# Patient Record
Sex: FEMALE | Race: WHITE | NOT HISPANIC OR LATINO | Employment: OTHER | URBAN - METROPOLITAN AREA
[De-identification: names, ages, dates, MRNs, and addresses within clinical notes are randomized per-mention and may not be internally consistent; named-entity substitution may affect disease eponyms.]

---

## 2023-03-22 ENCOUNTER — OFFICE VISIT (OUTPATIENT)
Dept: URGENT CARE | Facility: CLINIC | Age: 68
End: 2023-03-22
Payer: MEDICARE

## 2023-03-22 VITALS
HEART RATE: 104 BPM | RESPIRATION RATE: 20 BRPM | SYSTOLIC BLOOD PRESSURE: 148 MMHG | HEIGHT: 64 IN | DIASTOLIC BLOOD PRESSURE: 85 MMHG | TEMPERATURE: 99 F | BODY MASS INDEX: 25.61 KG/M2 | OXYGEN SATURATION: 92 % | WEIGHT: 150 LBS

## 2023-03-22 DIAGNOSIS — R52 BODY ACHES: ICD-10-CM

## 2023-03-22 DIAGNOSIS — R68.83 CHILLS: ICD-10-CM

## 2023-03-22 DIAGNOSIS — J02.0 STREP PHARYNGITIS: ICD-10-CM

## 2023-03-22 DIAGNOSIS — J02.9 SORE THROAT: Primary | ICD-10-CM

## 2023-03-22 LAB
CTP QC/QA: YES
CTP QC/QA: YES
MOLECULAR STREP A: POSITIVE
SARS-COV-2 AG RESP QL IA.RAPID: NEGATIVE

## 2023-03-22 PROCEDURE — 87651 POCT STREP A MOLECULAR: ICD-10-PCS | Mod: QW,S$GLB,, | Performed by: FAMILY MEDICINE

## 2023-03-22 PROCEDURE — 99214 OFFICE O/P EST MOD 30 MIN: CPT | Mod: S$GLB,,, | Performed by: FAMILY MEDICINE

## 2023-03-22 PROCEDURE — 87811 SARS-COV-2 COVID19 W/OPTIC: CPT | Mod: QW,S$GLB,, | Performed by: FAMILY MEDICINE

## 2023-03-22 PROCEDURE — 87811 SARS CORONAVIRUS 2 ANTIGEN POCT, MANUAL READ: ICD-10-PCS | Mod: QW,S$GLB,, | Performed by: FAMILY MEDICINE

## 2023-03-22 PROCEDURE — 99214 PR OFFICE/OUTPT VISIT, EST, LEVL IV, 30-39 MIN: ICD-10-PCS | Mod: S$GLB,,, | Performed by: FAMILY MEDICINE

## 2023-03-22 PROCEDURE — 87651 STREP A DNA AMP PROBE: CPT | Mod: QW,S$GLB,, | Performed by: FAMILY MEDICINE

## 2023-03-22 RX ORDER — AMLODIPINE BESYLATE 5 MG/1
5 TABLET ORAL EVERY MORNING
COMMUNITY
Start: 2023-02-25

## 2023-03-22 RX ORDER — DIAZEPAM 2 MG/1
2 TABLET ORAL
COMMUNITY
Start: 2023-02-24

## 2023-03-22 RX ORDER — GLIPIZIDE 5 MG/1
10 TABLET ORAL 2 TIMES DAILY
COMMUNITY
Start: 2023-03-20

## 2023-03-22 RX ORDER — PANTOPRAZOLE SODIUM 40 MG/1
40 TABLET, DELAYED RELEASE ORAL
COMMUNITY
Start: 2023-03-20

## 2023-03-22 RX ORDER — LISINOPRIL 10 MG/1
10 TABLET ORAL
COMMUNITY
Start: 2023-03-20

## 2023-03-22 RX ORDER — BUPROPION HYDROCHLORIDE 150 MG/1
150 TABLET, EXTENDED RELEASE ORAL 2 TIMES DAILY
COMMUNITY
Start: 2023-02-17

## 2023-03-22 RX ORDER — METFORMIN HYDROCHLORIDE 500 MG/1
500 TABLET ORAL 2 TIMES DAILY
COMMUNITY
Start: 2023-02-15

## 2023-03-22 RX ORDER — PENICILLIN V POTASSIUM 500 MG/1
500 TABLET, FILM COATED ORAL 2 TIMES DAILY
Qty: 20 TABLET | Refills: 0 | Status: SHIPPED | OUTPATIENT
Start: 2023-03-22 | End: 2023-04-01

## 2023-03-22 RX ORDER — DAPAGLIFLOZIN 10 MG/1
10 TABLET, FILM COATED ORAL
COMMUNITY
Start: 2023-02-15

## 2023-03-22 RX ORDER — METHADONE HYDROCHLORIDE 10 MG/1
TABLET ORAL
COMMUNITY
Start: 2023-03-17

## 2023-03-22 RX ORDER — DICLOFENAC SODIUM 10 MG/G
GEL TOPICAL
COMMUNITY
Start: 2022-12-16

## 2023-03-22 RX ORDER — PENICILLIN V POTASSIUM 500 MG/1
500 TABLET, FILM COATED ORAL 2 TIMES DAILY
Qty: 20 TABLET | Refills: 0 | Status: SHIPPED | OUTPATIENT
Start: 2023-03-22 | End: 2023-03-22

## 2023-03-22 NOTE — PROGRESS NOTES
"Subjective:       Patient ID: Lilian Alvarez is a 67 y.o. female.    Vitals:  height is 5' 4" (1.626 m) and weight is 68 kg (150 lb). Her oral temperature is 99 °F (37.2 °C). Her blood pressure is 148/85 (abnormal) and her pulse is 104. Her respiration is 20 and oxygen saturation is 92% (abnormal).     Chief Complaint: Sinus Problem    Pt complain of sore throat, body aches, and chills that started yesterday.  Oxygen saturation is found to be low at 92.  Patient states that low oxygen is normal for her as she suffers from lung cancer.  Denies chest pain, SOB, cough, runny nose, abdominal pain, nausea/vomiting, dysuria.  Pt took cortisone which gave a mild relief.     Sinus Problem  This is a new problem. The current episode started yesterday. The problem has been gradually worsening since onset. There has been no fever. Her pain is at a severity of 10/10. The pain is severe. Associated symptoms include chills, headaches and a sore throat. Pertinent negatives include no congestion, coughing, diaphoresis, ear pain, hoarse voice, neck pain, shortness of breath, sinus pressure, sneezing or swollen glands. Treatments tried: cortisone. The treatment provided mild relief.     Constitution: Positive for chills. Negative for sweating.   HENT:  Positive for sore throat. Negative for ear pain, congestion and sinus pressure.    Neck: Negative for neck pain.   Respiratory:  Negative for cough and shortness of breath.    Allergic/Immunologic: Negative for sneezing.   Neurological:  Positive for headaches.     Objective:      Physical Exam   Constitutional: She is oriented to person, place, and time. She appears well-developed. She is cooperative.  Non-toxic appearance. She does not appear ill. No distress.   HENT:   Head: Normocephalic and atraumatic.   Ears:   Right Ear: Hearing, tympanic membrane, external ear and ear canal normal. impacted cerumen  Left Ear: Hearing, tympanic membrane, external ear and ear canal normal. impacted " cerumen  Nose: Nose normal. No mucosal edema, rhinorrhea, nasal deformity or congestion. No epistaxis. Right sinus exhibits no maxillary sinus tenderness and no frontal sinus tenderness. Left sinus exhibits no maxillary sinus tenderness and no frontal sinus tenderness.   Mouth/Throat: Uvula is midline, oropharynx is clear and moist and mucous membranes are normal. No trismus in the jaw. Normal dentition. No uvula swelling. No oropharyngeal exudate or posterior oropharyngeal edema.      Comments: +ve pharyngeal erythema w/o tonsillar swelling or exudates.       Eyes: Conjunctivae and lids are normal. No scleral icterus.   Neck: Trachea normal and phonation normal. Neck supple. No edema present. No erythema present. No neck rigidity present.   Cardiovascular: Normal rate, regular rhythm, normal heart sounds and normal pulses.   No murmur heard.  Pulmonary/Chest: Effort normal and breath sounds normal. No stridor. No respiratory distress. She has no decreased breath sounds. She has no wheezes. She has no rhonchi. She has no rales.   Abdominal: Normal appearance. She exhibits no distension. There is no abdominal tenderness.   Musculoskeletal: Normal range of motion.         General: No deformity. Normal range of motion.      Cervical back: She exhibits no tenderness.   Lymphadenopathy:     She has no cervical adenopathy.   Neurological: She is alert and oriented to person, place, and time. She exhibits normal muscle tone. Coordination normal.   Skin: Skin is warm, dry, intact, not diaphoretic and not pale.   Psychiatric: Her speech is normal and behavior is normal. Judgment and thought content normal.   Nursing note and vitals reviewed.      Assessment:       1. Sore throat    2. Body aches    3. Chills    4. Strep pharyngitis          Plan:     Discussed results/diagnosis/plan with patient in clinic. Advised to f/u with PCP within 2-5 days. ER precautions given if symptoms get any worse. All questions answered. Patient  verbally understood and agreed with treatment plan.     Sore throat  -     SARS Coronavirus 2 Antigen, POCT Manual Read  -     POCT Strep A, Molecular    Body aches  -     SARS Coronavirus 2 Antigen, POCT Manual Read  -     POCT Strep A, Molecular    Chills  -     SARS Coronavirus 2 Antigen, POCT Manual Read    Strep pharyngitis    Other orders  -     penicillin v potassium (VEETID) 500 MG tablet; Take 1 tablet (500 mg total) by mouth 2 (two) times a day. for 10 days  Dispense: 20 tablet; Refill: 0

## 2025-05-15 ENCOUNTER — TELEPHONE (OUTPATIENT)
Dept: HEMATOLOGY/ONCOLOGY | Facility: CLINIC | Age: 70
End: 2025-05-15
Payer: COMMERCIAL

## 2025-05-15 DIAGNOSIS — C7A.8 PRIMARY MALIGNANT NEUROENDOCRINE TUMOR OF LUNG: Primary | ICD-10-CM

## 2025-06-10 ENCOUNTER — HOSPITAL ENCOUNTER (OUTPATIENT)
Dept: RADIOLOGY | Facility: HOSPITAL | Age: 70
Discharge: HOME | End: 2025-06-10
Attending: INTERNAL MEDICINE
Payer: COMMERCIAL

## 2025-06-10 ENCOUNTER — OFFICE VISIT (OUTPATIENT)
Dept: HEMATOLOGY/ONCOLOGY | Facility: CLINIC | Age: 70
End: 2025-06-10
Payer: COMMERCIAL

## 2025-06-10 VITALS
BODY MASS INDEX: 28.34 KG/M2 | HEART RATE: 91 BPM | DIASTOLIC BLOOD PRESSURE: 67 MMHG | WEIGHT: 165.13 LBS | SYSTOLIC BLOOD PRESSURE: 145 MMHG

## 2025-06-10 DIAGNOSIS — C7A.8 PRIMARY MALIGNANT NEUROENDOCRINE TUMOR OF LUNG: ICD-10-CM

## 2025-06-10 DIAGNOSIS — C7A.8 PRIMARY MALIGNANT NEUROENDOCRINE TUMOR OF LUNG: Primary | ICD-10-CM

## 2025-06-10 PROCEDURE — 3077F SYST BP >= 140 MM HG: CPT | Mod: CPTII,S$GLB,, | Performed by: INTERNAL MEDICINE

## 2025-06-10 PROCEDURE — 99205 OFFICE O/P NEW HI 60 MIN: CPT | Mod: S$GLB,,, | Performed by: INTERNAL MEDICINE

## 2025-06-10 PROCEDURE — A9592 HC COPPER CU-64, DOTATE, DX, PER 1 MCI: HCPCS | Mod: JZ,TB | Performed by: INTERNAL MEDICINE

## 2025-06-10 PROCEDURE — 1159F MED LIST DOCD IN RCRD: CPT | Mod: CPTII,S$GLB,, | Performed by: INTERNAL MEDICINE

## 2025-06-10 PROCEDURE — 99999 PR PBB SHADOW E&M-EST. PATIENT-LVL III: CPT | Mod: PBBFAC,,, | Performed by: INTERNAL MEDICINE

## 2025-06-10 PROCEDURE — 1158F ADVNC CARE PLAN TLK DOCD: CPT | Mod: CPTII,S$GLB,, | Performed by: INTERNAL MEDICINE

## 2025-06-10 PROCEDURE — 78815 PET IMAGE W/CT SKULL-THIGH: CPT | Mod: 26,PI,, | Performed by: NUCLEAR MEDICINE

## 2025-06-10 PROCEDURE — 3008F BODY MASS INDEX DOCD: CPT | Mod: CPTII,S$GLB,, | Performed by: INTERNAL MEDICINE

## 2025-06-10 PROCEDURE — 1125F AMNT PAIN NOTED PAIN PRSNT: CPT | Mod: CPTII,S$GLB,, | Performed by: INTERNAL MEDICINE

## 2025-06-10 PROCEDURE — 4010F ACE/ARB THERAPY RXD/TAKEN: CPT | Mod: CPTII,S$GLB,, | Performed by: INTERNAL MEDICINE

## 2025-06-10 PROCEDURE — 3288F FALL RISK ASSESSMENT DOCD: CPT | Mod: CPTII,S$GLB,, | Performed by: INTERNAL MEDICINE

## 2025-06-10 PROCEDURE — 1160F RVW MEDS BY RX/DR IN RCRD: CPT | Mod: CPTII,S$GLB,, | Performed by: INTERNAL MEDICINE

## 2025-06-10 PROCEDURE — 3078F DIAST BP <80 MM HG: CPT | Mod: CPTII,S$GLB,, | Performed by: INTERNAL MEDICINE

## 2025-06-10 PROCEDURE — 1101F PT FALLS ASSESS-DOCD LE1/YR: CPT | Mod: CPTII,S$GLB,, | Performed by: INTERNAL MEDICINE

## 2025-06-10 PROCEDURE — 78815 PET IMAGE W/CT SKULL-THIGH: CPT | Mod: TC

## 2025-06-10 RX ADMIN — COPPER CU 64 DOTATATE 5.7 MILLICURIE: 1 INJECTION, SOLUTION INTRAVENOUS at 11:06

## 2025-06-10 NOTE — PROGRESS NOTES
PATIENT: Lilian Alvarez  MRN: [id number]  DATE: 6/10/2025    Diagnosis:   1. Primary malignant neuroendocrine tumor of lung      Chief Complaint: neuroendocrine tumor    Oncologic History:      Oncologic History Neuroendocrine tumor of lung      Oncologic Treatment Octreotide (2020)  Lanreotide (discontinued December 2022, resumed in 2023)      Pathology       Subjective:    History of Present Illness: Ms. Alvarez is a 69 y.o. female who presents for evaluation and management of neuroendocrine tumor. She is referred by Dr. Carcamo at [facility].    - she reports having a nodule in the year 2000. She had it removed    - information per his note dated 4/8/25:      - she underwent labs and copper pet scan today.    - today, she endorses fatigue, pain under breast, and dyspnea upon exertion. She denies chest pain, nausea, vomiting, diarrhea, constipation.  - she reports her local oncologist was concerned about the potential growth of a right lower lobe lung mass on the CT scan from February 2025.        Past medical, surgical, family, and social histories have been reviewed and updated below.    Past Medical History: No past medical history on file.    Past Surgical History: No past surgical history on file.    Family History: No family history on file.    Social History:  reports that she has never smoked. She has never used smokeless tobacco. She reports that she does not drink alcohol and does not use drugs.    Allergies:  Review of patient's allergies indicates:  No Known Allergies    Medications:  Current Medications[1]    Review of Systems   Constitutional:  Positive for fatigue.   HENT:  Negative for sore throat.    Eyes:  Negative for visual disturbance.   Respiratory:  Positive for shortness of breath. Negative for cough.    Cardiovascular:  Negative for chest pain.   Gastrointestinal:  Negative for abdominal pain, constipation, diarrhea, nausea and vomiting.   Genitourinary:  Negative for dysuria.    Musculoskeletal:  Negative for back pain.   Skin:  Negative for rash.   Neurological:  Negative for headaches.   Hematological:  Negative for adenopathy.   Psychiatric/Behavioral:  The patient is not nervous/anxious.        ECOG Performance Status:   ECOG SCORE    1 - Restricted in strenuous activity-ambulatory and able to carry out work of a light nature         Objective:      Vitals:   Vitals:    06/10/25 1328   BP: (!) 145/67   BP Location: Left arm   Patient Position: Sitting   Pulse: 91   Weight: 74.9 kg (165 lb 2 oz)     BMI: Body mass index is 28.34 kg/m².    Physical Exam  Vitals and nursing note reviewed.   Constitutional:       Appearance: She is well-developed.   HENT:      Head: Normocephalic and atraumatic.   Eyes:      Pupils: Pupils are equal, round, and reactive to light.   Cardiovascular:      Rate and Rhythm: Normal rate and regular rhythm.   Pulmonary:      Effort: Pulmonary effort is normal.      Breath sounds: Normal breath sounds.   Abdominal:      General: Bowel sounds are normal.      Palpations: Abdomen is soft.   Musculoskeletal:         General: Normal range of motion.      Cervical back: Normal range of motion and neck supple.   Skin:     General: Skin is warm and dry.   Neurological:      Mental Status: She is alert and oriented to person, place, and time.   Psychiatric:         Behavior: Behavior normal.         Thought Content: Thought content normal.         Judgment: Judgment normal.         Laboratory Data:  Labs have been reviewed.    Lab Results   Component Value Date    WBC 6.67 06/10/2025    HGB 14.1 06/10/2025    HCT 42.2 06/10/2025    MCV 87 06/10/2025     06/10/2025           Imaging:    Copper pet scan (6/10/25): I have personally reviewed the images  Quality of the study: Adequate.     In the head and neck, there is physiologic distribution in the pituitary, salivary, and thyroid glands, and there are no tracer avid lesions suspicious for malignancy.     In the  chest, there are postoperative changes prior wedge resection involving the left upper lobe and left lower lobe.  There are innumerable pulmonary nodules in the bilateral lungs, noting measured nodules are below size threshold for PET characterization..     Index lesions described below:     -right lower lobe 1.0 cm nodule (axial series 3, image 121) with uptake similar to that of background     -left lower lobe 1.0 cm nodule (axial series 3, image 128) with uptake similar to that of background     In the abdomen and pelvis, there is physiologic uptake in the pancreatic uncinate process and body, liver, spleen, adrenal glands and  tract, and there are no tracer avid lesions suspicious for malignancy.     In the bones, there are no tracer avid lesions suspicious for malignancy.  Injection site noted in the right wrist     Additional CT findings     Calcific atherosclerotic disease of the coronary arteries aorta.  Left renal cyst.  Mild colorectal stool burden.     Impression:     1. History of left lung neuroendocrine tumor status post wedge resection of the left upper and lower lobes.  2. Innumerable pulmonary nodules without significant radiotracer uptake, noting the majority of which are below size threshold for PET characterization.  3. No radiotracer avid/enlarged lymph nodes.  No evidence of distant metastasis.      CT chest (2/5/25):        Assessment:       1. Primary malignant neuroendocrine tumor of lung           Plan:     Neuroendocrine tumor of lung  - I have reviewed her chart  - she reports having pulmonary nodules as far back as 2000  - diagnosed with low-grade neuroendocrine tumor of lung in 2020. She received octreotide, then lanreotide since then (break in late 2022/early 2023 due to insurance issues  - CT scan (2/5/25) revealed potential growth of a right lower lobe nodule, now measuring 3.2 cm. On scan from 2020, it measured 2.8 cm.  - copper pet scan (6/10/25) revealed innumerable lung nodules  without significant uptake (although relatively small nodules so perhaps below threshold for uptake).  - she has signed a release of information form. Will attempt to get images from 2025 ct scan.  - I reviewed the scan and diagnosis with her. As of now, I do not feel she has had significant progression of disease. I do not feel she needs to change therapy to lutathera at this time. Recommend continuation of lanreotide.  - once we receive the images from CT scan from 25, will present her case at neuroendocrine conference.  - I will contact her with results of neuroendocrine confernce. Follow-up plan will be made at that time.       Teodoro Phan M.D.  Hematology/Oncology  [facility] - [address], [address]  [place] [address]  Phone: [phone]  Fax: [phone]         [1]   Current Outpatient Medications   Medication Sig Dispense Refill    amLODIPine (NORVASC) 5 MG tablet Take 5 mg by mouth every morning.      buPROPion (WELLBUTRIN SR) 150 MG TBSR 12 hr tablet Take 150 mg by mouth 2 (two) times daily.      diazePAM (VALIUM) 2 MG tablet Take 2 mg by mouth.      diclofenac sodium (VOLTAREN) 1 % Gel SMARTSI Gram(s) Topical 2-4 Times Daily PRN      FARXIGA 10 mg tablet Take 10 mg by mouth.      glipiZIDE (GLUCOTROL) 5 MG tablet Take 10 mg by mouth 2 (two) times daily.      lisinopriL 10 MG tablet Take 10 mg by mouth.      metFORMIN (GLUCOPHAGE) 500 MG tablet Take 500 mg by mouth 2 (two) times daily.      methadone (DOLOPHINE) 10 MG tablet Take by mouth.      pantoprazole (PROTONIX) 40 MG tablet Take 40 mg by mouth.       No current facility-administered medications for this visit.

## 2025-08-29 ENCOUNTER — TELEPHONE (OUTPATIENT)
Dept: HEMATOLOGY/ONCOLOGY | Facility: CLINIC | Age: 70
End: 2025-08-29
Payer: COMMERCIAL